# Patient Record
Sex: FEMALE
[De-identification: names, ages, dates, MRNs, and addresses within clinical notes are randomized per-mention and may not be internally consistent; named-entity substitution may affect disease eponyms.]

---

## 2023-06-13 ENCOUNTER — RX ONLY (OUTPATIENT)
Age: 58
Setting detail: RX ONLY
End: 2023-06-13

## 2023-06-13 ENCOUNTER — APPOINTMENT (RX ONLY)
Dept: URBAN - METROPOLITAN AREA CLINIC 12 | Facility: CLINIC | Age: 58
Setting detail: DERMATOLOGY
End: 2023-06-13

## 2023-06-13 DIAGNOSIS — L71.8 OTHER ROSACEA: ICD-10-CM

## 2023-06-13 PROBLEM — D23.61 OTHER BENIGN NEOPLASM OF SKIN OF RIGHT UPPER LIMB, INCLUDING SHOULDER: Status: ACTIVE | Noted: 2023-06-13

## 2023-06-13 PROCEDURE — 99204 OFFICE O/P NEW MOD 45 MIN: CPT

## 2023-06-13 PROCEDURE — ? TREATMENT REGIMEN

## 2023-06-13 PROCEDURE — ? COUNSELING

## 2023-06-13 PROCEDURE — ? PRESCRIPTION

## 2023-06-13 RX ORDER — SULFACETAMIDE SODIUM AND SULFUR 100; 50 MG/G; MG/G
CREAM TOPICAL
Qty: 170 | Refills: 0 | Status: ERX | COMMUNITY
Start: 2023-06-13

## 2023-06-13 RX ORDER — DOXYCYCLINE HYCLATE 100 MG/1
CAPSULE, GELATIN COATED ORAL BID
Qty: 60 | Refills: 1 | Status: ERX | COMMUNITY
Start: 2023-06-13

## 2023-06-13 RX ORDER — SODIUM SULFACETAMIDE AND SULFUR 80; 40 MG/ML; MG/ML
LOTION TOPICAL
Qty: 473 | Refills: 0 | Status: CANCELLED

## 2023-06-13 RX ORDER — SODIUM SULFACETAMIDE AND SULFUR 80; 40 MG/ML; MG/ML
LOTION TOPICAL
Qty: 473 | Refills: 0 | Status: ERX | COMMUNITY
Start: 2023-06-13

## 2023-06-13 RX ORDER — PHARMACY COMPOUNDING ACCESSORY
EACH MISCELLANEOUS
Qty: 30 | Refills: 3 | COMMUNITY
Start: 2023-06-13

## 2023-06-13 RX ADMIN — SODIUM SULFACETAMIDE AND SULFUR: 80; 40 LOTION TOPICAL at 00:00

## 2023-06-13 RX ADMIN — Medication: at 00:00

## 2023-06-13 RX ADMIN — DOXYCYCLINE HYCLATE: 100 CAPSULE, GELATIN COATED ORAL at 00:00

## 2023-06-13 ASSESSMENT — LOCATION ZONE DERM: LOCATION ZONE: FACE

## 2023-06-13 ASSESSMENT — LOCATION DETAILED DESCRIPTION DERM
LOCATION DETAILED: LEFT CENTRAL MALAR CHEEK
LOCATION DETAILED: RIGHT MEDIAL FOREHEAD
LOCATION DETAILED: RIGHT CENTRAL MALAR CHEEK

## 2023-06-13 ASSESSMENT — LOCATION SIMPLE DESCRIPTION DERM
LOCATION SIMPLE: RIGHT FOREHEAD
LOCATION SIMPLE: RIGHT CHEEK
LOCATION SIMPLE: LEFT CHEEK

## 2023-06-13 NOTE — PROCEDURE: TREATMENT REGIMEN
Samples Given: LRP mineral facial sunscreen SPF 50 and LPR double repair with SPF 30
Plan to decrease to low dose doxycycline after completed two months of 100mg BID. Follow up in 2 months
Otc Regimen: Strict sunscreen regimen - double repair LRP with SPF or Avene.\\nRecommended a mineral sunscreen
Initiate Treatment: Triple rosacea cream ([place]): Apply to face twice daily\\ndoxycycline hyclate 100 mg capsule: Take 1 capsule po BID with food and water for 2 months
Detail Level: Simple

## 2023-06-13 NOTE — HPI: RASH (ROSACEA)
Is This A New Presentation, Or A Follow-Up?: Rash
Additional History: Patient currently only using over the counter products

## 2023-06-14 ENCOUNTER — RX ONLY (OUTPATIENT)
Age: 58
Setting detail: RX ONLY
End: 2023-06-14

## 2023-06-14 RX ORDER — PHARMACY COMPOUNDING ACCESSORY
EACH MISCELLANEOUS
Qty: 30 | Refills: 3 | Status: ERX

## 2023-06-14 RX ORDER — PHARMACY COMPOUNDING ACCESSORY
EACH MISCELLANEOUS
Qty: 30 | Refills: 3 | Status: CANCELLED
Stop reason: SDUPTHER

## 2023-08-28 ENCOUNTER — APPOINTMENT (RX ONLY)
Dept: URBAN - METROPOLITAN AREA CLINIC 12 | Facility: CLINIC | Age: 58
Setting detail: DERMATOLOGY
End: 2023-08-28

## 2023-08-28 DIAGNOSIS — L71.8 OTHER ROSACEA: ICD-10-CM | Status: WELL CONTROLLED

## 2023-08-28 PROCEDURE — ? COUNSELING

## 2023-08-28 PROCEDURE — 99213 OFFICE O/P EST LOW 20 MIN: CPT

## 2023-08-28 PROCEDURE — ? TREATMENT REGIMEN

## 2023-08-28 ASSESSMENT — LOCATION DETAILED DESCRIPTION DERM
LOCATION DETAILED: RIGHT MEDIAL FOREHEAD
LOCATION DETAILED: LEFT CENTRAL MALAR CHEEK
LOCATION DETAILED: RIGHT CENTRAL MALAR CHEEK

## 2023-08-28 ASSESSMENT — LOCATION SIMPLE DESCRIPTION DERM
LOCATION SIMPLE: LEFT CHEEK
LOCATION SIMPLE: RIGHT FOREHEAD
LOCATION SIMPLE: RIGHT CHEEK

## 2023-08-28 ASSESSMENT — LOCATION ZONE DERM: LOCATION ZONE: FACE

## 2023-08-28 NOTE — PROCEDURE: TREATMENT REGIMEN
Continue Regimen: Triple rosacea cream ([place]) and sulfacleanse
Discontinue Regimen: Doxycycline
Plan: Significantly improved with one week of PO doxycycline. Now clear (other than erythema and telangiectatias) with topicals. RTC if worsening, may call for refills
Otc Regimen: Strict sunscreen regimen -CeraVe mineral sunscreen & Avene cleanser and moisturizer.
Detail Level: Simple